# Patient Record
Sex: FEMALE | URBAN - NONMETROPOLITAN AREA
[De-identification: names, ages, dates, MRNs, and addresses within clinical notes are randomized per-mention and may not be internally consistent; named-entity substitution may affect disease eponyms.]

---

## 2021-09-07 ENCOUNTER — APPOINTMENT (RX ONLY)
Dept: URBAN - NONMETROPOLITAN AREA CLINIC 22 | Facility: CLINIC | Age: 56
Setting detail: DERMATOLOGY
End: 2021-09-07

## 2021-09-07 DIAGNOSIS — L21.8 OTHER SEBORRHEIC DERMATITIS: ICD-10-CM

## 2021-09-07 PROCEDURE — ? TREATMENT REGIMEN

## 2021-09-07 PROCEDURE — ? ADDITIONAL NOTES

## 2021-09-07 PROCEDURE — 99203 OFFICE O/P NEW LOW 30 MIN: CPT

## 2021-09-07 PROCEDURE — ? COUNSELING

## 2021-09-07 ASSESSMENT — LOCATION ZONE DERM: LOCATION ZONE: SCALP

## 2021-09-07 ASSESSMENT — LOCATION SIMPLE DESCRIPTION DERM: LOCATION SIMPLE: POSTERIOR SCALP

## 2021-09-07 ASSESSMENT — LOCATION DETAILED DESCRIPTION DERM
LOCATION DETAILED: MID-OCCIPITAL SCALP
LOCATION DETAILED: POSTERIOR MID-PARIETAL SCALP

## 2021-09-07 NOTE — PROCEDURE: COUNSELING
Patient Specific Counseling (Will Not Stick From Patient To Patient): \\n  Discussed with patient, she can continue the oral antifungal until completion. Patient should only use the ketoconazole up to 3x weekly. Scale, flaking and crusting is very minimal at this point, but she has several patches that are quite inflamed and irritated. Offered prednisone taper, but patient declines this as she has sarcoidosis and does not want to increase her daily dose. I have recommended that she start using the Clobetasol scalp solution she was recently prescribed but has not started applying yet. Should use this BID for 7 day cycles.
Detail Level: Zone

## 2021-09-07 NOTE — PROCEDURE: TREATMENT REGIMEN
Modify Regimen: Ketoconazole shampoo 3x weekly. Patient was using twice daily.
Initiate Treatment: Clobetasol scalp solution as directed. Patient has medication at home.
Detail Level: Zone